# Patient Record
Sex: MALE | Race: WHITE | Employment: OTHER | URBAN - METROPOLITAN AREA
[De-identification: names, ages, dates, MRNs, and addresses within clinical notes are randomized per-mention and may not be internally consistent; named-entity substitution may affect disease eponyms.]

---

## 2016-12-29 RX ORDER — ALPRAZOLAM 0.5 MG/1
0.5 TABLET ORAL 2 TIMES DAILY PRN
COMMUNITY

## 2016-12-29 RX ORDER — BENAZEPRIL HYDROCHLORIDE 10 MG/1
10 TABLET ORAL DAILY
COMMUNITY

## 2016-12-29 RX ORDER — ATENOLOL 100 MG/1
50 TABLET ORAL DAILY
COMMUNITY

## 2016-12-29 RX ORDER — CHOLESTYRAMINE LIGHT 4 G/5.7G
4 POWDER, FOR SUSPENSION ORAL DAILY
COMMUNITY

## 2016-12-29 RX ORDER — ALPHA LIPOIC ACID 300 MG
300 CAPSULE ORAL DAILY
COMMUNITY

## 2016-12-29 RX ORDER — CHLORAL HYDRATE 500 MG
1000 CAPSULE ORAL DAILY
COMMUNITY

## 2016-12-29 RX ORDER — DOXAZOSIN 2 MG/1
2 TABLET ORAL NIGHTLY
COMMUNITY

## 2016-12-29 RX ORDER — AMLODIPINE BESYLATE 10 MG/1
10 TABLET ORAL DAILY
COMMUNITY

## 2016-12-29 RX ORDER — PAROXETINE HYDROCHLORIDE 20 MG/1
20 TABLET, FILM COATED ORAL
COMMUNITY

## 2016-12-29 RX ORDER — PRAVASTATIN SODIUM 40 MG
40 TABLET ORAL NIGHTLY
COMMUNITY

## 2016-12-29 RX ORDER — INSULIN ASPART 100 [IU]/ML
INJECTION, SOLUTION INTRAVENOUS; SUBCUTANEOUS
COMMUNITY

## 2017-01-11 ENCOUNTER — APPOINTMENT (OUTPATIENT)
Dept: LAB | Facility: HOSPITAL | Age: 82
End: 2017-01-11
Attending: RADIOLOGY
Payer: MEDICARE

## 2017-01-11 ENCOUNTER — HOSPITAL ENCOUNTER (OUTPATIENT)
Dept: GENERAL RADIOLOGY | Facility: HOSPITAL | Age: 82
Discharge: HOME | End: 2017-01-11
Attending: ORTHOPAEDIC SURGERY
Payer: MEDICARE

## 2017-01-11 ENCOUNTER — HOSPITAL ENCOUNTER (OUTPATIENT)
Dept: CT IMAGING | Facility: HOSPITAL | Age: 82
Discharge: HOME | End: 2017-01-11
Attending: ORTHOPAEDIC SURGERY
Payer: MEDICARE

## 2017-01-11 VITALS
TEMPERATURE: 98 F | DIASTOLIC BLOOD PRESSURE: 82 MMHG | BODY MASS INDEX: 27.11 KG/M2 | OXYGEN SATURATION: 97 % | WEIGHT: 183 LBS | SYSTOLIC BLOOD PRESSURE: 140 MMHG | HEIGHT: 69 IN | RESPIRATION RATE: 16 BRPM | HEART RATE: 51 BPM

## 2017-01-11 VITALS
HEART RATE: 52 BPM | RESPIRATION RATE: 16 BRPM | SYSTOLIC BLOOD PRESSURE: 138 MMHG | OXYGEN SATURATION: 97 % | DIASTOLIC BLOOD PRESSURE: 63 MMHG

## 2017-01-11 DIAGNOSIS — M54.50 ACUTE BILATERAL LOW BACK PAIN: ICD-10-CM

## 2017-01-11 DIAGNOSIS — M54.31 RIGHT SIDED SCIATICA: ICD-10-CM

## 2017-01-11 DIAGNOSIS — M54.50 ACUTE BILATERAL LOW BACK PAIN: Primary | ICD-10-CM

## 2017-01-11 LAB
BASOPHILS # BLD AUTO: 0.08 X10(3) UL (ref 0–0.1)
BASOPHILS NFR BLD AUTO: 1.2 %
EOSINOPHIL # BLD AUTO: 0.25 X10(3) UL (ref 0–0.3)
EOSINOPHIL NFR BLD AUTO: 3.6 %
ERYTHROCYTE [DISTWIDTH] IN BLOOD BY AUTOMATED COUNT: 13.3 % (ref 11.5–16)
GLUCOSE BLD-MCNC: 106 MG/DL (ref 65–99)
GLUCOSE BLD-MCNC: 124 MG/DL (ref 65–99)
HCT VFR BLD AUTO: 41 % (ref 37–53)
HGB BLD-MCNC: 13.6 G/DL (ref 13–17)
IMMATURE GRANULOCYTE COUNT: 0.03 X10(3) UL (ref 0–1)
IMMATURE GRANULOCYTE RATIO %: 0.4 %
INR BLD: 0.92 (ref 0.89–1.12)
LYMPHOCYTES # BLD AUTO: 1.34 X10(3) UL (ref 0.9–4)
LYMPHOCYTES NFR BLD AUTO: 19.4 %
MCH RBC QN AUTO: 29.9 PG (ref 27–33.2)
MCHC RBC AUTO-ENTMCNC: 33.2 G/DL (ref 31–37)
MCV RBC AUTO: 90.1 FL (ref 80–99)
MONOCYTES # BLD AUTO: 0.66 X10(3) UL (ref 0.1–0.6)
MONOCYTES NFR BLD AUTO: 9.6 %
NEUTROPHIL ABS PRELIM: 4.55 X10 (3) UL (ref 1.3–6.7)
NEUTROPHILS # BLD AUTO: 4.55 X10(3) UL (ref 1.3–6.7)
NEUTROPHILS NFR BLD AUTO: 65.8 %
PLATELET # BLD AUTO: 173 10(3)UL (ref 150–450)
PSA SERPL DL<=0.01 NG/ML-MCNC: 12.6 SECONDS (ref 12.3–14.8)
RBC # BLD AUTO: 4.55 X10(6)UL (ref 3.8–5.8)
RED CELL DISTRIBUTION WIDTH-SD: 43.5 FL (ref 35.1–46.3)
WBC # BLD AUTO: 6.9 X10(3) UL (ref 4–13)

## 2017-01-11 PROCEDURE — 82962 GLUCOSE BLOOD TEST: CPT

## 2017-01-11 PROCEDURE — 72132 CT LUMBAR SPINE W/DYE: CPT

## 2017-01-11 PROCEDURE — 62304 MYELOGRAPHY LUMBAR INJECTION: CPT

## 2017-01-11 PROCEDURE — 85025 COMPLETE CBC W/AUTO DIFF WBC: CPT | Performed by: RADIOLOGY

## 2017-01-11 PROCEDURE — 85610 PROTHROMBIN TIME: CPT

## 2017-01-11 RX ORDER — SODIUM CHLORIDE 9 MG/ML
INJECTION, SOLUTION INTRAVENOUS CONTINUOUS
Status: DISCONTINUED | OUTPATIENT
Start: 2017-01-11 | End: 2017-01-15

## 2017-01-11 RX ORDER — DIAZEPAM 5 MG/1
TABLET ORAL
Status: COMPLETED
Start: 2017-01-11 | End: 2017-01-11

## 2017-01-11 RX ORDER — DIAZEPAM 5 MG/1
10 TABLET ORAL
Status: COMPLETED | OUTPATIENT
Start: 2017-01-11 | End: 2017-01-11

## 2017-01-11 RX ADMIN — DIAZEPAM 10 MG: 5 TABLET ORAL at 10:15:00

## 2017-01-11 NOTE — PROCEDURES
[facility]  Procedure Note    Anais Montes De Oca Patient Status:  Outpatient    1935 MRN [id number]   Location  [address] Day # 0 PCP Tani Estrada     Procedure: Lumbar myelogram    Pre-Proced

## 2023-03-27 ENCOUNTER — WALK IN (OUTPATIENT)
Dept: URGENT CARE | Age: 88
End: 2023-03-27
Attending: FAMILY MEDICINE

## 2023-03-27 VITALS
HEART RATE: 97 BPM | OXYGEN SATURATION: 98 % | RESPIRATION RATE: 18 BRPM | DIASTOLIC BLOOD PRESSURE: 90 MMHG | SYSTOLIC BLOOD PRESSURE: 163 MMHG | TEMPERATURE: 97.5 F

## 2023-03-27 DIAGNOSIS — S01.01XA LACERATION OF SCALP, INITIAL ENCOUNTER: Primary | ICD-10-CM

## 2023-03-27 PROCEDURE — 12002 RPR S/N/AX/GEN/TRNK2.6-7.5CM: CPT

## 2023-03-27 PROCEDURE — 99202 OFFICE O/P NEW SF 15 MIN: CPT

## 2023-03-27 RX ORDER — AMIODARONE HYDROCHLORIDE 200 MG/1
1 TABLET ORAL DAILY
COMMUNITY
Start: 2023-03-06

## 2023-03-27 RX ORDER — ICOSAPENT ETHYL 1000 MG/1
CAPSULE ORAL
COMMUNITY
Start: 2023-03-27

## 2023-03-27 RX ORDER — FAMOTIDINE 40 MG/1
1 TABLET, FILM COATED ORAL DAILY
COMMUNITY
Start: 2022-11-22

## 2023-03-27 RX ORDER — ACETAMINOPHEN 500 MG
500 TABLET ORAL
COMMUNITY

## 2023-03-27 RX ORDER — ROSUVASTATIN CALCIUM 20 MG/1
1 TABLET, COATED ORAL EVERY EVENING
COMMUNITY
Start: 2023-02-09

## 2023-03-27 RX ORDER — PAROXETINE HYDROCHLORIDE 40 MG/1
1 TABLET, FILM COATED ORAL DAILY
COMMUNITY
Start: 2023-02-28

## 2023-03-27 RX ORDER — ALPRAZOLAM 0.5 MG/1
0.5 TABLET ORAL
COMMUNITY
Start: 2023-03-21

## 2023-03-27 RX ORDER — METOPROLOL SUCCINATE 25 MG/1
12.5 TABLET, EXTENDED RELEASE ORAL
COMMUNITY
Start: 2023-03-08

## 2023-03-27 RX ORDER — CHLORAL HYDRATE 500 MG
1000 CAPSULE ORAL DAILY
COMMUNITY

## 2023-03-27 RX ORDER — TRAZODONE HYDROCHLORIDE 50 MG/1
TABLET ORAL
COMMUNITY
Start: 2023-01-09

## 2023-03-27 RX ORDER — PRAVASTATIN SODIUM 40 MG
40 TABLET ORAL NIGHTLY
COMMUNITY

## 2023-03-27 RX ORDER — INSULIN ASPART 100 [IU]/ML
INJECTION, SOLUTION INTRAVENOUS; SUBCUTANEOUS
COMMUNITY

## 2023-03-27 RX ORDER — PERPHENAZINE 16 MG
600 TABLET ORAL
COMMUNITY

## 2023-03-27 RX ORDER — WARFARIN SODIUM 5 MG/1
TABLET ORAL
COMMUNITY

## 2023-04-04 ENCOUNTER — OFFICE VISIT (OUTPATIENT)
Dept: URGENT CARE | Age: 88
End: 2023-04-04
Attending: FAMILY MEDICINE

## 2023-04-04 VITALS
WEIGHT: 163 LBS | RESPIRATION RATE: 12 BRPM | OXYGEN SATURATION: 98 % | SYSTOLIC BLOOD PRESSURE: 152 MMHG | HEART RATE: 55 BPM | DIASTOLIC BLOOD PRESSURE: 71 MMHG | TEMPERATURE: 96.1 F

## 2023-04-04 DIAGNOSIS — Z48.02 ENCOUNTER FOR REMOVAL OF SUTURES: Primary | ICD-10-CM

## 2023-04-04 PROCEDURE — 99211 OFF/OP EST MAY X REQ PHY/QHP: CPT

## 2023-04-04 ASSESSMENT — PAIN SCALES - GENERAL: PAINLEVEL: 0

## 2023-04-12 ENCOUNTER — APPOINTMENT (OUTPATIENT)
Dept: URGENT CARE | Age: 88
End: 2023-04-12
Attending: FAMILY MEDICINE

## 2025-04-05 ENCOUNTER — WALK IN (OUTPATIENT)
Dept: URGENT CARE | Age: 89
End: 2025-04-05

## 2025-04-05 VITALS
HEART RATE: 60 BPM | TEMPERATURE: 97.6 F | SYSTOLIC BLOOD PRESSURE: 126 MMHG | OXYGEN SATURATION: 97 % | DIASTOLIC BLOOD PRESSURE: 61 MMHG | RESPIRATION RATE: 17 BRPM

## 2025-04-05 DIAGNOSIS — J02.9 ACUTE PHARYNGITIS, UNSPECIFIED ETIOLOGY: ICD-10-CM

## 2025-04-05 DIAGNOSIS — J02.9 SORETHROAT: Primary | ICD-10-CM

## 2025-04-05 LAB
INTERNAL PROCEDURAL CONTROLS ACCEPTABLE: YES
S PYO AG THROAT QL IA.RAPID: NEGATIVE
TEST LOT EXPIRATION DATE: NORMAL
TEST LOT NUMBER: NORMAL

## 2025-04-05 RX ORDER — AMLODIPINE BESYLATE 5 MG/1
1 TABLET ORAL DAILY
COMMUNITY
Start: 2024-10-28

## 2025-04-05 RX ORDER — BUMETANIDE 1 MG/1
1 TABLET ORAL DAILY
COMMUNITY
Start: 2024-10-30

## 2025-04-05 RX ORDER — HYDRALAZINE HYDROCHLORIDE 100 MG/1
200 TABLET, FILM COATED ORAL 2 TIMES DAILY
COMMUNITY
Start: 2024-10-30

## 2025-04-05 RX ORDER — INSULIN GLARGINE 100 [IU]/ML
32 INJECTION, SOLUTION SUBCUTANEOUS DAILY
COMMUNITY
Start: 2024-12-04

## 2025-04-05 ASSESSMENT — ENCOUNTER SYMPTOMS
DIARRHEA: 0
WHEEZING: 0
ACTIVITY CHANGE: 0
ADENOPATHY: 0
ABDOMINAL DISTENTION: 0
SINUS PAIN: 0
VOMITING: 0
NEUROLOGICAL NEGATIVE: 1
CHEST TIGHTNESS: 0
CHILLS: 0
SORE THROAT: 1
SHORTNESS OF BREATH: 0
AGITATION: 0
RHINORRHEA: 0
ABDOMINAL PAIN: 0
NAUSEA: 0
EYE PAIN: 0
FEVER: 0
COUGH: 0

## 2025-04-07 LAB — S PYO SPEC QL CULT: NORMAL
